# Patient Record
Sex: FEMALE | URBAN - METROPOLITAN AREA
[De-identification: names, ages, dates, MRNs, and addresses within clinical notes are randomized per-mention and may not be internally consistent; named-entity substitution may affect disease eponyms.]

---

## 2024-08-02 ENCOUNTER — APPOINTMENT (OUTPATIENT)
Dept: RESEARCH | Facility: MEDICAL CENTER | Age: 54
End: 2024-08-02

## 2024-08-09 ENCOUNTER — APPOINTMENT (OUTPATIENT)
Dept: RESEARCH | Facility: MEDICAL CENTER | Age: 54
End: 2024-08-09

## 2024-08-09 ENCOUNTER — RESEARCH ENCOUNTER (OUTPATIENT)
Dept: RESEARCH | Facility: MEDICAL CENTER | Age: 54
End: 2024-08-09

## 2024-08-09 DIAGNOSIS — Z00.6 RESEARCH STUDY PATIENT: ICD-10-CM

## 2024-08-09 NOTE — RESEARCH NOTE
Patient is eligible for the following studies: HNP. The consent form(s) have been pushed to the patient's MyChart for the virtual encounter. Sent patient a [employer] message.

## 2024-08-09 NOTE — RESEARCH NOTE
Confirmed with the participant they do not have a designated provider whom they would like study results shared with. Patient will have an opportunity to share the results with any providers of their choosing in the future by accessing their results in [employer].

## 2024-09-01 LAB
APOB+LDLR+PCSK9 GENE MUT ANL BLD/T: NOT DETECTED
BRCA1+BRCA2 DEL+DUP + FULL MUT ANL BLD/T: NOT DETECTED
MLH1+MSH2+MSH6+PMS2 GN DEL+DUP+FUL M: NOT DETECTED